# Patient Record
Sex: MALE | Race: WHITE | HISPANIC OR LATINO | Employment: STUDENT
[De-identification: names, ages, dates, MRNs, and addresses within clinical notes are randomized per-mention and may not be internally consistent; named-entity substitution may affect disease eponyms.]

---
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
## 2025-03-19 ENCOUNTER — APPOINTMENT (OUTPATIENT)
Dept: BEHAVIORAL HEALTH | Facility: CLINIC | Age: 20
End: 2025-03-19
Payer: COMMERCIAL

## 2025-03-19 DIAGNOSIS — F41.1 GAD (GENERALIZED ANXIETY DISORDER): ICD-10-CM

## 2025-03-19 DIAGNOSIS — F41.0 PANIC ATTACK: ICD-10-CM

## 2025-03-19 DIAGNOSIS — F90.2 ATTENTION-DEFICIT HYPERACTIVITY DISORDER, COMBINED TYPE: ICD-10-CM

## 2025-03-19 PROCEDURE — 99214 OFFICE O/P EST MOD 30 MIN: CPT | Performed by: CLINICAL NURSE SPECIALIST

## 2025-03-19 RX ORDER — LISDEXAMFETAMINE DIMESYLATE 40 MG/1
40 CAPSULE ORAL EVERY MORNING
Qty: 30 CAPSULE | Refills: 0 | Status: SHIPPED | OUTPATIENT
Start: 2025-05-08 | End: 2025-06-07

## 2025-03-19 RX ORDER — DEXTROAMPHETAMINE SACCHARATE, AMPHETAMINE ASPARTATE, DEXTROAMPHETAMINE SULFATE AND AMPHETAMINE SULFATE 2.5; 2.5; 2.5; 2.5 MG/1; MG/1; MG/1; MG/1
10 TABLET ORAL DAILY PRN
Qty: 30 TABLET | Refills: 0 | Status: SHIPPED | OUTPATIENT
Start: 2025-04-16 | End: 2025-05-16

## 2025-03-19 RX ORDER — LISDEXAMFETAMINE DIMESYLATE 40 MG/1
40 CAPSULE ORAL EVERY MORNING
Qty: 30 CAPSULE | Refills: 0 | Status: SHIPPED | OUTPATIENT
Start: 2025-04-10 | End: 2025-05-10

## 2025-03-19 RX ORDER — SERTRALINE HYDROCHLORIDE 100 MG/1
TABLET, FILM COATED ORAL
Qty: 30 TABLET | Refills: 1 | Status: SHIPPED | OUTPATIENT
Start: 2025-03-19

## 2025-03-19 RX ORDER — DEXTROAMPHETAMINE SACCHARATE, AMPHETAMINE ASPARTATE, DEXTROAMPHETAMINE SULFATE AND AMPHETAMINE SULFATE 2.5; 2.5; 2.5; 2.5 MG/1; MG/1; MG/1; MG/1
10 TABLET ORAL DAILY PRN
Qty: 30 TABLET | Refills: 0 | Status: SHIPPED | OUTPATIENT
Start: 2025-03-19 | End: 2025-04-18

## 2025-03-19 ASSESSMENT — ENCOUNTER SYMPTOMS
SLEEP DISTURBANCE: 1
DYSPHORIC MOOD: 0
NERVOUS/ANXIOUS: 1
CONSTITUTIONAL NEGATIVE: 1
CONFUSION: 0
NEUROLOGICAL NEGATIVE: 1
AGITATION: 0
HALLUCINATIONS: 0
HYPERACTIVE: 0
DECREASED CONCENTRATION: 1

## 2025-03-19 NOTE — PROGRESS NOTES
"Subjective   Patient ID: Yannick Vang is a 19 y.o. male who presents for evaluation and management of ADHD symptoms.  We also discussed increased level of anxiety--previously sleep initiation difficulty he reported racing thoughts \"all in my head but I cannot settle in the evening\"-obtain consent for trial of sertraline--hydroxyzine partially helpful.      Yannick is a 19-year-old male.  He is being treated for ADHD.  He is currently taking Vyvanse 40 mg with positive effect.  He notices a difference without the medication.   He is an avid .  He enjoys school.  Does not take medication on weekends.  No adverse effects noted.  No safety concerns noted.  Denied depressive symptoms.  Increased anxious symptoms--messaged recently--see niya notes-- and had a few severe episodes--consent for sertraline--I reviewed RBA and BBW for SSRI's  No depressive symptoms noted.  Bright smiling interactive.  We will continue 40 mg dose and  adderall prn for homework.    He is planning on studying engineering enjoying [employer] His sister recently graduated from the school and both parents are alumni.      Mental status exam appearance appropriately groomed casually dressed  curly hair.  Pleasant and cooperative motor within normal limits.  Affect euthymic.  Mood \"good\" speech normal tone and volume.  Thought process logical.  Thought content clear denied AV hallucinations.  No delusions noted.  No SI.  No HI.  No obsessions or compulsions noted.  Judgment is good.  Insight is good.  Cognition grossly intact.  Oriented in all spheres.  Concentration is improved with Vyvanse.      Review of Systems   Constitutional: Negative.    Cardiovascular:         No cardiac anomalies or syncope noted.   Neurological: Negative.    Psychiatric/Behavioral:  Positive for decreased concentration and sleep disturbance. Negative for agitation, behavioral problems, confusion, dysphoric mood, hallucinations, self-injury and suicidal ideas. " The patient is nervous/anxious. The patient is not hyperactive.         Increased anxiety surrounding school assignments less manageable. 2-3 severe attacks  ADHD well-controlled with current regimen.   sleep difficulty intermittent-hydroxyzine helpful       Objective   Physical Exam  Constitutional:       Appearance: Normal appearance. He is normal weight.   Neurological:      Mental Status: He is alert and oriented to person, place, and time. Mental status is at baseline.   Psychiatric:         Mood and Affect: Mood normal.         Behavior: Behavior normal.         Thought Content: Thought content normal.         Judgment: Judgment normal.      Comments: Anxiety primary concern today         Lab Review:   not applicable    Assessment/Plan   Trial [id number] mg x 8 days, then 100 mg daily  Reviewed RBA and BBW for SSRI's  Vyvanse 40 mg daily   Adderall 10 mg prn  I have considered and discussed the risks of abuse, dependence, addiction, and diversion.  Oarrs reviewed  Controlled substance agreement completed 2024  Call/message 3-4 weeks and as necessary.  hydroxyzine targeting sleep difficulty  RTC 12 weeks discussed requirement for in office appointment once yearly.

## 2025-05-15 DIAGNOSIS — F41.1 GAD (GENERALIZED ANXIETY DISORDER): ICD-10-CM

## 2025-05-15 RX ORDER — SERTRALINE HYDROCHLORIDE 100 MG/1
100 TABLET, FILM COATED ORAL DAILY
Qty: 30 TABLET | Refills: 2 | Status: SHIPPED | OUTPATIENT
Start: 2025-05-15 | End: 2025-08-13

## 2025-05-21 ENCOUNTER — APPOINTMENT (OUTPATIENT)
Dept: BEHAVIORAL HEALTH | Facility: CLINIC | Age: 20
End: 2025-05-21
Payer: COMMERCIAL

## 2025-05-28 ENCOUNTER — TELEMEDICINE (OUTPATIENT)
Dept: BEHAVIORAL HEALTH | Facility: CLINIC | Age: 20
End: 2025-05-28
Payer: COMMERCIAL

## 2025-05-28 DIAGNOSIS — F41.0 PANIC ATTACK: ICD-10-CM

## 2025-05-28 DIAGNOSIS — G47.9 SLEEP DIFFICULTIES: ICD-10-CM

## 2025-05-28 DIAGNOSIS — F90.2 ATTENTION-DEFICIT HYPERACTIVITY DISORDER, COMBINED TYPE: ICD-10-CM

## 2025-05-28 DIAGNOSIS — F41.1 GAD (GENERALIZED ANXIETY DISORDER): ICD-10-CM

## 2025-05-28 PROCEDURE — 99214 OFFICE O/P EST MOD 30 MIN: CPT | Performed by: CLINICAL NURSE SPECIALIST

## 2025-05-28 RX ORDER — DEXTROAMPHETAMINE SACCHARATE, AMPHETAMINE ASPARTATE, DEXTROAMPHETAMINE SULFATE AND AMPHETAMINE SULFATE 2.5; 2.5; 2.5; 2.5 MG/1; MG/1; MG/1; MG/1
10 TABLET ORAL DAILY
Qty: 30 TABLET | Refills: 0 | Status: SHIPPED | OUTPATIENT
Start: 2025-07-23 | End: 2025-08-22

## 2025-05-28 RX ORDER — DEXTROAMPHETAMINE SACCHARATE, AMPHETAMINE ASPARTATE, DEXTROAMPHETAMINE SULFATE AND AMPHETAMINE SULFATE 2.5; 2.5; 2.5; 2.5 MG/1; MG/1; MG/1; MG/1
10 TABLET ORAL DAILY
Qty: 30 TABLET | Refills: 0 | Status: SHIPPED | OUTPATIENT
Start: 2025-06-25 | End: 2025-07-25

## 2025-05-28 RX ORDER — LISDEXAMFETAMINE DIMESYLATE 40 MG/1
40 CAPSULE ORAL EVERY MORNING
Qty: 30 CAPSULE | Refills: 0 | Status: SHIPPED | OUTPATIENT
Start: 2025-07-03 | End: 2025-08-02

## 2025-05-28 RX ORDER — LISDEXAMFETAMINE DIMESYLATE 40 MG/1
40 CAPSULE ORAL EVERY MORNING
Qty: 30 CAPSULE | Refills: 0 | Status: SHIPPED | OUTPATIENT
Start: 2025-07-31 | End: 2025-08-30

## 2025-05-28 RX ORDER — LISDEXAMFETAMINE DIMESYLATE 40 MG/1
40 CAPSULE ORAL EVERY MORNING
Qty: 30 CAPSULE | Refills: 0 | Status: SHIPPED | OUTPATIENT
Start: 2025-06-05 | End: 2025-07-05

## 2025-05-28 RX ORDER — SERTRALINE HYDROCHLORIDE 100 MG/1
100 TABLET, FILM COATED ORAL DAILY
Qty: 30 TABLET | Refills: 2 | Status: SHIPPED | OUTPATIENT
Start: 2025-05-28 | End: 2025-08-26

## 2025-05-28 RX ORDER — HYDROXYZINE HYDROCHLORIDE 25 MG/1
25 TABLET, FILM COATED ORAL 3 TIMES DAILY PRN
Qty: 90 TABLET | Refills: 2 | Status: SHIPPED | OUTPATIENT
Start: 2025-05-28 | End: 2025-08-26

## 2025-05-28 RX ORDER — DEXTROAMPHETAMINE SACCHARATE, AMPHETAMINE ASPARTATE, DEXTROAMPHETAMINE SULFATE AND AMPHETAMINE SULFATE 2.5; 2.5; 2.5; 2.5 MG/1; MG/1; MG/1; MG/1
10 TABLET ORAL DAILY
Qty: 30 TABLET | Refills: 0 | Status: SHIPPED | OUTPATIENT
Start: 2025-05-28 | End: 2025-06-27

## 2025-05-28 ASSESSMENT — ENCOUNTER SYMPTOMS
NERVOUS/ANXIOUS: 1
AGITATION: 0
SLEEP DISTURBANCE: 1
HYPERACTIVE: 0
NEUROLOGICAL NEGATIVE: 1
CONFUSION: 0
HALLUCINATIONS: 0
CONSTITUTIONAL NEGATIVE: 1
DECREASED CONCENTRATION: 1
DYSPHORIC MOOD: 0

## 2025-05-28 NOTE — PROGRESS NOTES
"Subjective   Patient ID: Yannick Vang is a 19 y.o. male who presents for evaluation and management of ADHD symptoms.  We also discussed increased level of anxiety--previously sleep initiation difficulty he reported racing thoughts \"all in my head but I cannot settle in the evening\"-obtain consent for trial of sertraline--hydroxyzine partially helpful.      Yannick is a 19-year-old male.  He is being treated for ADHD and anxiety.  He is currently taking Vyvanse 40 mg with positive effect.  Adderall 10 mg for afternoon/evening studying and zoloft 100 mg daily.   He is an avid .  He enjoys school.  Does not take medication on weekends.  No adverse effects noted.  No safety concerns noted.  Denied depressive symptoms.  Diminished/manageable anxious symptoms-- Bright smiling interactive.  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 .    He is  studying engineering enjoyed firsty year at [employer].      Mental status exam appearance appropriately groomed casually dressed  curly hair.  Pleasant and cooperative motor within normal limits.  Affect euthymic.  Mood \"good\" speech normal tone and volume.  Thought process logical.  Thought content clear denied AV hallucinations.  No delusions noted.  No SI.  No HI.  No obsessions or compulsions noted.  Judgment is good.  Insight is good.  Cognition grossly intact.  Oriented in all spheres.  Concentration is improved with Vyvanse.      Review of Systems   Constitutional: Negative.    Cardiovascular:         No cardiac anomalies or syncope noted.   Neurological: Negative.    Psychiatric/Behavioral:  Positive for decreased concentration and sleep disturbance. Negative for agitation, behavioral problems, confusion, dysphoric mood, hallucinations, self-injury and suicidal ideas. The patient is nervous/anxious. The patient is not hyperactive.         Diminished anxiety surrounding school " assignments --tolerating zoloft. ADHD well-controlled with current regimen.   sleep difficulty intermittent-hydroxyzine helpful       Objective   Physical Exam  Constitutional:       Appearance: Normal appearance. He is normal weight.   Neurological:      Mental Status: He is alert and oriented to person, place, and time. Mental status is at baseline.   Psychiatric:         Mood and Affect: Mood normal.         Behavior: Behavior normal.         Thought Content: Thought content normal.         Judgment: Judgment normal.      Comments: Anxiety diminished--zoloft helpful. Vyvance/adderall positive effect--no adverse effects noted--will complete CSA in August         Lab Review:   not applicable    Assessment/Plan   zoloft 100 mg daily  Reviewed RBA and BBW for SSRI's  Vyvanse 40 mg daily   Adderall 10 mg prn  I have considered and discussed the risks of abuse, dependence, addiction, and diversion.  [facility] reviewed  Controlled substance agreement due summer 2025  Call/message as necessary.  hydroxyzine targeting sleep difficulty  RTC 12 weeks discussed requirement for in office appointment once yearly.

## 2025-08-14 ENCOUNTER — APPOINTMENT (OUTPATIENT)
Dept: PRIMARY CARE | Facility: CLINIC | Age: 20
End: 2025-08-14
Payer: COMMERCIAL

## 2025-08-14 VITALS
BODY MASS INDEX: 21.03 KG/M2 | SYSTOLIC BLOOD PRESSURE: 118 MMHG | HEART RATE: 101 BPM | WEIGHT: 134 LBS | DIASTOLIC BLOOD PRESSURE: 80 MMHG | OXYGEN SATURATION: 97 % | HEIGHT: 67 IN

## 2025-08-14 DIAGNOSIS — Z00.00 ANNUAL PHYSICAL EXAM: Primary | ICD-10-CM

## 2025-08-14 DIAGNOSIS — E55.9 VITAMIN D DEFICIENCY: ICD-10-CM

## 2025-08-14 DIAGNOSIS — R68.84 PAIN IN MANDIBLE: ICD-10-CM

## 2025-08-14 PROCEDURE — 99214 OFFICE O/P EST MOD 30 MIN: CPT | Performed by: NURSE PRACTITIONER

## 2025-08-14 PROCEDURE — 1036F TOBACCO NON-USER: CPT | Performed by: NURSE PRACTITIONER

## 2025-08-14 PROCEDURE — 3008F BODY MASS INDEX DOCD: CPT | Performed by: NURSE PRACTITIONER

## 2025-08-14 ASSESSMENT — ENCOUNTER SYMPTOMS
COUGH: 0
PALPITATIONS: 0
NAUSEA: 0
HEADACHES: 0
SHORTNESS OF BREATH: 0
LIGHT-HEADEDNESS: 0
MUSCULOSKELETAL NEGATIVE: 1
DYSPHORIC MOOD: 0
RHINORRHEA: 0
NERVOUS/ANXIOUS: 0
NUMBNESS: 0
DIZZINESS: 0
VOMITING: 0
FATIGUE: 0
CHEST TIGHTNESS: 0
ACTIVITY CHANGE: 0
DIARRHEA: 0
APPETITE CHANGE: 0
SORE THROAT: 0
BRUISES/BLEEDS EASILY: 0
CONSTIPATION: 0
SLEEP DISTURBANCE: 0

## 2025-08-14 ASSESSMENT — PATIENT HEALTH QUESTIONNAIRE - PHQ9
2. FEELING DOWN, DEPRESSED OR HOPELESS: SEVERAL DAYS
SUM OF ALL RESPONSES TO PHQ9 QUESTIONS 1 AND 2: 1
1. LITTLE INTEREST OR PLEASURE IN DOING THINGS: NOT AT ALL

## 2025-08-20 ENCOUNTER — HOSPITAL ENCOUNTER (OUTPATIENT)
Dept: RADIOLOGY | Facility: CLINIC | Age: 20
Discharge: HOME | End: 2025-08-20
Payer: COMMERCIAL

## 2025-08-20 DIAGNOSIS — R68.84 PAIN IN MANDIBLE: ICD-10-CM

## 2025-08-20 PROCEDURE — 70330 X-RAY EXAM OF JAW JOINTS: CPT

## 2025-08-22 ENCOUNTER — RESULTS FOLLOW-UP (OUTPATIENT)
Dept: PRIMARY CARE | Facility: CLINIC | Age: 20
End: 2025-08-22
Payer: COMMERCIAL